# Patient Record
Sex: FEMALE | Race: BLACK OR AFRICAN AMERICAN
[De-identification: names, ages, dates, MRNs, and addresses within clinical notes are randomized per-mention and may not be internally consistent; named-entity substitution may affect disease eponyms.]

---

## 2018-05-06 ENCOUNTER — HOSPITAL ENCOUNTER (OUTPATIENT)
Dept: HOSPITAL 95 - ER | Age: 58
Setting detail: OBSERVATION
LOS: 2 days | Discharge: HOME | End: 2018-05-08
Attending: INTERNAL MEDICINE | Admitting: INTERNAL MEDICINE
Payer: SELF-PAY

## 2018-05-06 VITALS — HEIGHT: 69.02 IN | WEIGHT: 171.08 LBS | BODY MASS INDEX: 25.34 KG/M2

## 2018-05-06 DIAGNOSIS — Z79.52: ICD-10-CM

## 2018-05-06 DIAGNOSIS — R06.03: ICD-10-CM

## 2018-05-06 DIAGNOSIS — Z88.1: ICD-10-CM

## 2018-05-06 DIAGNOSIS — Z88.0: ICD-10-CM

## 2018-05-06 DIAGNOSIS — J96.00: ICD-10-CM

## 2018-05-06 DIAGNOSIS — Z79.899: ICD-10-CM

## 2018-05-06 DIAGNOSIS — E09.65: ICD-10-CM

## 2018-05-06 DIAGNOSIS — J45.901: Primary | ICD-10-CM

## 2018-05-06 DIAGNOSIS — Z88.8: ICD-10-CM

## 2018-05-06 DIAGNOSIS — Z88.5: ICD-10-CM

## 2018-05-06 DIAGNOSIS — M81.0: ICD-10-CM

## 2018-05-06 DIAGNOSIS — Z85.3: ICD-10-CM

## 2018-05-06 DIAGNOSIS — Z79.01: ICD-10-CM

## 2018-05-06 DIAGNOSIS — I10: ICD-10-CM

## 2018-05-06 LAB
ALBUMIN SERPL BCP-MCNC: 3.8 G/DL (ref 3.4–5)
ALBUMIN/GLOB SERPL: 1 {RATIO} (ref 0.8–1.8)
ALT SERPL W P-5'-P-CCNC: 24 U/L (ref 12–78)
ANION GAP SERPL CALCULATED.4IONS-SCNC: 8 MMOL/L (ref 6–16)
AST SERPL W P-5'-P-CCNC: 17 U/L (ref 12–37)
BASOPHILS # BLD AUTO: 0.07 K/MM3 (ref 0–0.23)
BASOPHILS NFR BLD AUTO: 1 % (ref 0–2)
BILIRUB SERPL-MCNC: 0.3 MG/DL (ref 0.1–1)
BUN SERPL-MCNC: 12 MG/DL (ref 8–24)
CALCIUM SERPL-MCNC: 8.7 MG/DL (ref 8.5–10.1)
CHLORIDE SERPL-SCNC: 104 MMOL/L (ref 98–108)
CO2 SERPL-SCNC: 28 MMOL/L (ref 21–32)
CREAT SERPL-MCNC: 0.83 MG/DL (ref 0.4–1)
DEPRECATED RDW RBC AUTO: 48.2 FL (ref 35.1–46.3)
EOSINOPHIL # BLD AUTO: 0.11 K/MM3 (ref 0–0.68)
EOSINOPHIL NFR BLD AUTO: 1 % (ref 0–6)
ERYTHROCYTE [DISTWIDTH] IN BLOOD BY AUTOMATED COUNT: 14.8 % (ref 11.7–14.2)
GLOBULIN SER CALC-MCNC: 3.7 G/DL (ref 2.2–4)
GLUCOSE SERPL-MCNC: 294 MG/DL (ref 70–99)
HCT VFR BLD AUTO: 39.6 % (ref 33–51)
HGB BLD-MCNC: 13 G/DL (ref 11.5–16)
IMM GRANULOCYTES # BLD AUTO: 0.06 K/MM3 (ref 0–0.1)
IMM GRANULOCYTES NFR BLD AUTO: 1 % (ref 0–1)
LYMPHOCYTES # BLD AUTO: 4.78 K/MM3 (ref 0.84–5.2)
LYMPHOCYTES NFR BLD AUTO: 42 % (ref 21–46)
MCHC RBC AUTO-ENTMCNC: 32.8 G/DL (ref 31.5–36.5)
MCV RBC AUTO: 89 FL (ref 80–100)
MONOCYTES # BLD AUTO: 0.9 K/MM3 (ref 0.16–1.47)
MONOCYTES NFR BLD AUTO: 8 % (ref 4–13)
NEUTROPHILS # BLD AUTO: 5.4 K/MM3 (ref 1.96–9.15)
NEUTROPHILS NFR BLD AUTO: 48 % (ref 41–73)
NRBC # BLD AUTO: 0 K/MM3 (ref 0–0.02)
NRBC BLD AUTO-RTO: 0 /100 WBC (ref 0–0.2)
PH BLDA: 7.51 [PH] (ref 7.35–7.45)
PLATELET # BLD AUTO: 307 K/MM3 (ref 150–400)
POTASSIUM SERPL-SCNC: 2.8 MMOL/L (ref 3.5–5.5)
PROT SERPL-MCNC: 7.5 G/DL (ref 6.4–8.2)
SODIUM SERPL-SCNC: 140 MMOL/L (ref 136–145)
TROPONIN I SERPL-MCNC: 0.03 NG/ML (ref 0–0.04)

## 2018-05-06 PROCEDURE — G0378 HOSPITAL OBSERVATION PER HR: HCPCS

## 2018-05-06 PROCEDURE — C9113 INJ PANTOPRAZOLE SODIUM, VIA: HCPCS

## 2019-09-23 ENCOUNTER — HOSPITAL ENCOUNTER (EMERGENCY)
Dept: HOSPITAL 95 - ER | Age: 59
Discharge: HOME | End: 2019-09-23
Payer: OTHER GOVERNMENT

## 2019-09-23 VITALS — WEIGHT: 175 LBS | HEIGHT: 68 IN | BODY MASS INDEX: 26.52 KG/M2

## 2019-09-23 DIAGNOSIS — Z91.018: ICD-10-CM

## 2019-09-23 DIAGNOSIS — Z88.8: ICD-10-CM

## 2019-09-23 DIAGNOSIS — Z88.0: ICD-10-CM

## 2019-09-23 DIAGNOSIS — Z88.5: ICD-10-CM

## 2019-09-23 DIAGNOSIS — Z79.891: ICD-10-CM

## 2019-09-23 DIAGNOSIS — Z79.899: ICD-10-CM

## 2019-09-23 DIAGNOSIS — E11.65: ICD-10-CM

## 2019-09-23 DIAGNOSIS — Z91.030: ICD-10-CM

## 2019-09-23 DIAGNOSIS — Z88.1: ICD-10-CM

## 2019-09-23 DIAGNOSIS — J45.901: Primary | ICD-10-CM

## 2019-09-23 DIAGNOSIS — E87.6: ICD-10-CM

## 2019-09-23 DIAGNOSIS — J96.01: ICD-10-CM

## 2019-09-23 LAB
ALBUMIN SERPL BCP-MCNC: 3.7 G/DL (ref 3.4–5)
ALBUMIN/GLOB SERPL: 1 {RATIO} (ref 0.8–1.8)
ALT SERPL W P-5'-P-CCNC: 18 U/L (ref 12–78)
ANION GAP SERPL CALCULATED.4IONS-SCNC: 8 MMOL/L (ref 6–16)
AST SERPL W P-5'-P-CCNC: 19 U/L (ref 12–37)
BASOPHILS # BLD AUTO: 0.05 K/MM3 (ref 0–0.23)
BASOPHILS NFR BLD AUTO: 0 % (ref 0–2)
BILIRUB SERPL-MCNC: 0.3 MG/DL (ref 0.1–1)
BUN SERPL-MCNC: 17 MG/DL (ref 8–24)
CALCIUM SERPL-MCNC: 9.3 MG/DL (ref 8.5–10.1)
CHLORIDE SERPL-SCNC: 102 MMOL/L (ref 98–108)
CO2 SERPL-SCNC: 30 MMOL/L (ref 21–32)
CREAT SERPL-MCNC: 0.7 MG/DL (ref 0.4–1)
DEPRECATED RDW RBC AUTO: 46.6 FL (ref 35.1–46.3)
EOSINOPHIL # BLD AUTO: 0.03 K/MM3 (ref 0–0.68)
EOSINOPHIL NFR BLD AUTO: 0 % (ref 0–6)
ERYTHROCYTE [DISTWIDTH] IN BLOOD BY AUTOMATED COUNT: 14.6 % (ref 11.7–14.2)
GLOBULIN SER CALC-MCNC: 3.6 G/DL (ref 2.2–4)
GLUCOSE SERPL-MCNC: 223 MG/DL (ref 70–99)
HCT VFR BLD AUTO: 36.9 % (ref 33–51)
HGB BLD-MCNC: 12 G/DL (ref 11.5–16)
IMM GRANULOCYTES # BLD AUTO: 0.04 K/MM3 (ref 0–0.1)
IMM GRANULOCYTES NFR BLD AUTO: 0 % (ref 0–1)
LYMPHOCYTES # BLD AUTO: 1.98 K/MM3 (ref 0.84–5.2)
LYMPHOCYTES NFR BLD AUTO: 18 % (ref 21–46)
MCHC RBC AUTO-ENTMCNC: 32.5 G/DL (ref 31.5–36.5)
MCV RBC AUTO: 88 FL (ref 80–100)
MONOCYTES # BLD AUTO: 0.49 K/MM3 (ref 0.16–1.47)
MONOCYTES NFR BLD AUTO: 4 % (ref 4–13)
NEUTROPHILS # BLD AUTO: 8.64 K/MM3 (ref 1.96–9.15)
NEUTROPHILS NFR BLD AUTO: 77 % (ref 41–73)
NRBC # BLD AUTO: 0 K/MM3 (ref 0–0.02)
NRBC BLD AUTO-RTO: 0 /100 WBC (ref 0–0.2)
PLATELET # BLD AUTO: 318 K/MM3 (ref 150–400)
POTASSIUM SERPL-SCNC: 2.9 MMOL/L (ref 3.5–5.5)
PROT SERPL-MCNC: 7.3 G/DL (ref 6.4–8.2)
SODIUM SERPL-SCNC: 140 MMOL/L (ref 136–145)

## 2021-02-16 ENCOUNTER — HOSPITAL ENCOUNTER (EMERGENCY)
Dept: HOSPITAL 95 - ER | Age: 61
Discharge: HOME | End: 2021-02-16
Payer: OTHER GOVERNMENT

## 2021-02-16 VITALS — BODY MASS INDEX: 28.12 KG/M2 | HEIGHT: 66 IN | WEIGHT: 175 LBS

## 2021-02-16 DIAGNOSIS — Z91.018: ICD-10-CM

## 2021-02-16 DIAGNOSIS — I10: ICD-10-CM

## 2021-02-16 DIAGNOSIS — Z88.5: ICD-10-CM

## 2021-02-16 DIAGNOSIS — J45.901: Primary | ICD-10-CM

## 2021-02-16 DIAGNOSIS — Z88.0: ICD-10-CM

## 2021-02-16 DIAGNOSIS — Z91.030: ICD-10-CM

## 2021-02-16 DIAGNOSIS — Z79.52: ICD-10-CM

## 2021-02-16 DIAGNOSIS — Z88.1: ICD-10-CM

## 2021-02-16 DIAGNOSIS — Z79.899: ICD-10-CM

## 2021-02-16 DIAGNOSIS — Z88.8: ICD-10-CM

## 2021-02-16 LAB
ALBUMIN SERPL BCP-MCNC: 3.6 G/DL (ref 3.4–5)
ALBUMIN/GLOB SERPL: 0.9 {RATIO} (ref 0.8–1.8)
ALT SERPL W P-5'-P-CCNC: 24 U/L (ref 12–78)
ANION GAP SERPL CALCULATED.4IONS-SCNC: 6 MMOL/L (ref 6–16)
AST SERPL W P-5'-P-CCNC: 22 U/L (ref 12–37)
BASOPHILS # BLD AUTO: 0.03 K/MM3 (ref 0–0.23)
BASOPHILS NFR BLD AUTO: 0 % (ref 0–2)
BILIRUB SERPL-MCNC: 0.4 MG/DL (ref 0.1–1)
BUN SERPL-MCNC: 16 MG/DL (ref 8–24)
CALCIUM SERPL-MCNC: 9.4 MG/DL (ref 8.5–10.1)
CHLORIDE SERPL-SCNC: 106 MMOL/L (ref 98–108)
CO2 SERPL-SCNC: 27 MMOL/L (ref 21–32)
CREAT SERPL-MCNC: 0.77 MG/DL (ref 0.4–1)
DEPRECATED RDW RBC AUTO: 46.9 FL (ref 35.1–46.3)
EOSINOPHIL # BLD AUTO: 0.06 K/MM3 (ref 0–0.68)
EOSINOPHIL NFR BLD AUTO: 1 % (ref 0–6)
ERYTHROCYTE [DISTWIDTH] IN BLOOD BY AUTOMATED COUNT: 14.7 % (ref 11.7–14.2)
GLOBULIN SER CALC-MCNC: 4 G/DL (ref 2.2–4)
GLUCOSE SERPL-MCNC: 161 MG/DL (ref 70–99)
HCT VFR BLD AUTO: 37.4 % (ref 33–51)
HGB BLD-MCNC: 12.2 G/DL (ref 11.5–16)
IMM GRANULOCYTES # BLD AUTO: 0.04 K/MM3 (ref 0–0.1)
IMM GRANULOCYTES NFR BLD AUTO: 1 % (ref 0–1)
LYMPHOCYTES # BLD AUTO: 1.33 K/MM3 (ref 0.84–5.2)
LYMPHOCYTES NFR BLD AUTO: 16 % (ref 21–46)
MCHC RBC AUTO-ENTMCNC: 32.6 G/DL (ref 31.5–36.5)
MCV RBC AUTO: 88 FL (ref 80–100)
MONOCYTES # BLD AUTO: 0.43 K/MM3 (ref 0.16–1.47)
MONOCYTES NFR BLD AUTO: 5 % (ref 4–13)
NEUTROPHILS # BLD AUTO: 6.6 K/MM3 (ref 1.96–9.15)
NEUTROPHILS NFR BLD AUTO: 78 % (ref 41–73)
NRBC # BLD AUTO: 0 K/MM3 (ref 0–0.02)
NRBC BLD AUTO-RTO: 0 /100 WBC (ref 0–0.2)
PH BLDA: 7.46 [PH] (ref 7.35–7.45)
PLATELET # BLD AUTO: 264 K/MM3 (ref 150–400)
POTASSIUM SERPL-SCNC: 3.7 MMOL/L (ref 3.5–5.5)
PROT SERPL-MCNC: 7.6 G/DL (ref 6.4–8.2)
SODIUM SERPL-SCNC: 139 MMOL/L (ref 136–145)

## 2022-09-08 ENCOUNTER — HOSPITAL ENCOUNTER (INPATIENT)
Dept: HOSPITAL 95 - ER | Age: 62
LOS: 1 days | Discharge: HOME | DRG: 189 | End: 2022-09-09
Attending: INTERNAL MEDICINE | Admitting: HOSPITALIST
Payer: OTHER GOVERNMENT

## 2022-09-08 VITALS — BODY MASS INDEX: 25.63 KG/M2 | HEIGHT: 68 IN | WEIGHT: 169.09 LBS

## 2022-09-08 DIAGNOSIS — J96.01: Primary | ICD-10-CM

## 2022-09-08 DIAGNOSIS — I48.91: ICD-10-CM

## 2022-09-08 DIAGNOSIS — J45.51: ICD-10-CM

## 2022-09-08 DIAGNOSIS — Z20.822: ICD-10-CM

## 2022-09-08 DIAGNOSIS — Z91.030: ICD-10-CM

## 2022-09-08 DIAGNOSIS — Z91.018: ICD-10-CM

## 2022-09-08 DIAGNOSIS — E09.9: ICD-10-CM

## 2022-09-08 DIAGNOSIS — J30.2: ICD-10-CM

## 2022-09-08 DIAGNOSIS — Z85.3: ICD-10-CM

## 2022-09-08 DIAGNOSIS — Z79.899: ICD-10-CM

## 2022-09-08 DIAGNOSIS — G47.00: ICD-10-CM

## 2022-09-08 DIAGNOSIS — I10: ICD-10-CM

## 2022-09-08 DIAGNOSIS — Z88.8: ICD-10-CM

## 2022-09-08 DIAGNOSIS — F41.9: ICD-10-CM

## 2022-09-08 DIAGNOSIS — M81.0: ICD-10-CM

## 2022-09-08 DIAGNOSIS — Z88.0: ICD-10-CM

## 2022-09-08 DIAGNOSIS — Z90.710: ICD-10-CM

## 2022-09-08 DIAGNOSIS — Z90.13: ICD-10-CM

## 2022-09-08 DIAGNOSIS — Z90.49: ICD-10-CM

## 2022-09-08 DIAGNOSIS — T38.0X5A: ICD-10-CM

## 2022-09-08 DIAGNOSIS — Z88.5: ICD-10-CM

## 2022-09-08 LAB
ALBUMIN SERPL BCP-MCNC: 3.7 G/DL (ref 3.4–5)
ALBUMIN/GLOB SERPL: 1 {RATIO} (ref 0.8–1.8)
ALT SERPL W P-5'-P-CCNC: 22 U/L (ref 12–78)
ANION GAP SERPL CALCULATED.4IONS-SCNC: 8 MMOL/L (ref 6–16)
AST SERPL W P-5'-P-CCNC: 22 U/L (ref 12–37)
BASOPHILS # BLD AUTO: 0.07 K/MM3 (ref 0–0.23)
BASOPHILS NFR BLD AUTO: 1 % (ref 0–2)
BILIRUB SERPL-MCNC: 0.3 MG/DL (ref 0.1–1)
BUN SERPL-MCNC: 12 MG/DL (ref 8–24)
CALCIUM SERPL-MCNC: 9.3 MG/DL (ref 8.5–10.1)
CHLORIDE SERPL-SCNC: 104 MMOL/L (ref 98–108)
CO2 SERPL-SCNC: 28 MMOL/L (ref 21–32)
CREAT SERPL-MCNC: 0.75 MG/DL (ref 0.4–1)
DEPRECATED RDW RBC AUTO: 43.9 FL (ref 35.1–46.3)
EOSINOPHIL # BLD AUTO: 0.18 K/MM3 (ref 0–0.68)
EOSINOPHIL NFR BLD AUTO: 2 % (ref 0–6)
ERYTHROCYTE [DISTWIDTH] IN BLOOD BY AUTOMATED COUNT: 14.1 % (ref 11.7–14.2)
FLUAV RNA SPEC QL NAA+PROBE: NEGATIVE
FLUBV RNA SPEC QL NAA+PROBE: NEGATIVE
GLOBULIN SER CALC-MCNC: 3.8 G/DL (ref 2.2–4)
GLUCOSE SERPL-MCNC: 130 MG/DL (ref 70–99)
HCT VFR BLD AUTO: 36 % (ref 33–51)
HGB BLD-MCNC: 11.9 G/DL (ref 11.5–16)
IMM GRANULOCYTES # BLD AUTO: 0.02 K/MM3 (ref 0–0.1)
IMM GRANULOCYTES NFR BLD AUTO: 0 % (ref 0–1)
LYMPHOCYTES # BLD AUTO: 3.46 K/MM3 (ref 0.84–5.2)
LYMPHOCYTES NFR BLD AUTO: 43 % (ref 21–46)
MCHC RBC AUTO-ENTMCNC: 33.1 G/DL (ref 31.5–36.5)
MCV RBC AUTO: 85 FL (ref 80–100)
MONOCYTES # BLD AUTO: 0.84 K/MM3 (ref 0.16–1.47)
MONOCYTES NFR BLD AUTO: 10 % (ref 4–13)
NEUTROPHILS # BLD AUTO: 3.51 K/MM3 (ref 1.96–9.15)
NEUTROPHILS NFR BLD AUTO: 44 % (ref 41–73)
NRBC # BLD AUTO: 0 K/MM3 (ref 0–0.02)
NRBC BLD AUTO-RTO: 0 /100 WBC (ref 0–0.2)
PH BLDA: 7.39 [PH] (ref 7.35–7.45)
PLATELET # BLD AUTO: 372 K/MM3 (ref 150–400)
POTASSIUM SERPL-SCNC: 3 MMOL/L (ref 3.5–5.5)
PROT SERPL-MCNC: 7.5 G/DL (ref 6.4–8.2)
RSV RNA SPEC QL NAA+PROBE: NEGATIVE
SARS-COV-2 RNA RESP QL NAA+PROBE: NEGATIVE
SODIUM SERPL-SCNC: 140 MMOL/L (ref 136–145)

## 2022-09-08 PROCEDURE — A9270 NON-COVERED ITEM OR SERVICE: HCPCS

## 2022-09-08 PROCEDURE — 5A09357 ASSISTANCE WITH RESPIRATORY VENTILATION, LESS THAN 24 CONSECUTIVE HOURS, CONTINUOUS POSITIVE AIRWAY PRESSURE: ICD-10-PCS | Performed by: INTERNAL MEDICINE

## 2022-09-08 NOTE — NUR
SHIFT SUMMARY-
PT ADMITTED THROUGH THE ED FOR ACUTE RESPIRATORY FAILURE/ASTHMA EXACERBATION.
THIS IS AN ISSUE THE PT HAS FREQUENTLY AND MANAGES WELL AT HOME, FOR THE MOST
PART. PT HAS A Hx OF BREAST CANCER SP DOUBLE MASTECTOMY WITH RECONSTRUCTION
THAT FAILED AND WAS REMOVED. PT HAS SEVERAL ALLERGIES TO FOODS AND MEDICATIONS
THAT CAUSE ANAPHYLACTIC RESPONSE. SIGNS MADE AND PLACED ON THE DOOR TO HER
ROOM TO WARN STAFF NO CARROTS OR PEACHES OR MRS DASH (CONTAINS CARROTS) THE PT
HAS AN ANAPHYLACTIC REACTION TO EATING FOODS THAT HAVE COME IN CONTACT WITH
THESE FOODS. KITCHEN NOTIFIED VERBALLY, DIET ORDER CHANGED TO SHOW THESE AS
DISLIKES AND A FOOD REQUEST ORDER WAS PLACED STATING DEATHLY ALLERGIC TO THESE
ITEMS. PT IS AWARE AND PAYS ATTENTION TO WHAT SHE EATS.
 
PT RECIEVED PO ATIVAN THIS EVENING AND BREATHING SEEMS TO HAVE RELAXED AND SHE
IS RESTING IN BED, HOB ELEVATED, SPOUSE AT THE BEDSIDE. PT STATES SHE CHECKS
HER BG DAILY AND HER NUMBERS AT HOME AR TYPICALLY IN 'S. BG HERE WAS
254 AFTER THE PT RECIEVED IV SOLUMEDROL.
 
PT IS ALERT AND ORIENTED AND INDEPENDENT IN THE ROOM. IN BED, SPOUSE AT THE
BEDSIDE NO S&S OF DISTRESS AT THIS TIME WILL CTM AND PASS ON TO NIGHT RN IN
BEDSIDE REPORT.

## 2022-09-09 LAB
ANION GAP SERPL CALCULATED.4IONS-SCNC: 9 MMOL/L (ref 6–16)
BUN SERPL-MCNC: 14 MG/DL (ref 8–24)
CALCIUM SERPL-MCNC: 9.2 MG/DL (ref 8.5–10.1)
CHLORIDE SERPL-SCNC: 105 MMOL/L (ref 98–108)
CO2 SERPL-SCNC: 25 MMOL/L (ref 21–32)
CREAT SERPL-MCNC: 0.67 MG/DL (ref 0.4–1)
GLUCOSE SERPL-MCNC: 254 MG/DL (ref 70–99)
POTASSIUM SERPL-SCNC: 3.4 MMOL/L (ref 3.5–5.5)
SODIUM SERPL-SCNC: 139 MMOL/L (ref 136–145)

## 2022-09-09 NOTE — NUR
DISCHARGE NOTE-
PT WAS GIVEN VERBAL AND WRITTEN DISCHARGE INSTRUCTIONS AND ACKNOWLEDGED
UNDERSTANDING OF THEM. IV AND TELE DC'D PRIOR TO DISCHARGE. WHEN IV WAS PULLED
THERE WAS HEAVY BLEEDING FROM THE INSERTION POINT. BLEEDING WAS STOPPED PRIOR
TO DISCHARGE. PT WAS ESCORTED OUT VIA WC BY THE CNA NO FURTHER QUESTIONS, NO
S&S OF DISTRESS NOTED AT THE TIME OF DISCHARGE.

## 2022-09-09 NOTE — NUR
PT IS A/OX4, PLEASANT AND COOPERATIVE. THE PT IS UP IND IN HER ROOM. THE PT
APPEARS TO BE BREATHING EASILY ON RA AT THIS TIME. THE PT WEARS A CPAP AT
NIGHT WHILE SLEEPING. THE PT WAS MEDICATED FOR ANXIETY X1 TONIGHT. THE PT
REPORTED SOME SLIGHT RIGHT SIDE OF THROAT SWELLING/TENDERNESS A CALL WAS MADE
TO DR. JERNIGAN PER THE PT'S REQUEST AND ORDERS WERE GIVEN AND ADMINISTRATED. THE
PT WAS MEDICATED FOR ELEVATED BP THIS AM. THE PT WAS AWAKE FOR MOST OF THE
NIGHT. CALL LIGHT IN REACH. WILL CONTINUE TO MONITOR AND ASSESS FOR CHANGES

## 2022-11-30 ENCOUNTER — HOSPITAL ENCOUNTER (EMERGENCY)
Dept: HOSPITAL 95 - ER | Age: 62
Discharge: HOME | End: 2022-11-30
Payer: OTHER GOVERNMENT

## 2022-11-30 VITALS — BODY MASS INDEX: 24.99 KG/M2 | HEIGHT: 65 IN | WEIGHT: 150 LBS

## 2022-11-30 DIAGNOSIS — R06.2: ICD-10-CM

## 2022-11-30 DIAGNOSIS — Z91.030: ICD-10-CM

## 2022-11-30 DIAGNOSIS — I10: ICD-10-CM

## 2022-11-30 DIAGNOSIS — Z88.0: ICD-10-CM

## 2022-11-30 DIAGNOSIS — T78.1XXA: Primary | ICD-10-CM

## 2022-11-30 DIAGNOSIS — Z88.8: ICD-10-CM

## 2022-11-30 DIAGNOSIS — Z91.018: ICD-10-CM

## 2022-11-30 DIAGNOSIS — M81.0: ICD-10-CM

## 2022-11-30 DIAGNOSIS — Z88.1: ICD-10-CM

## 2022-11-30 DIAGNOSIS — Z79.899: ICD-10-CM

## 2022-11-30 DIAGNOSIS — Z79.4: ICD-10-CM

## 2022-11-30 LAB — PH BLDV: 7.46 [PH] (ref 7.34–7.37)

## 2023-05-22 ENCOUNTER — HOSPITAL ENCOUNTER (INPATIENT)
Dept: HOSPITAL 95 - ER | Age: 63
LOS: 2 days | Discharge: HOME | DRG: 202 | End: 2023-05-24
Attending: INTERNAL MEDICINE | Admitting: INTERNAL MEDICINE
Payer: OTHER GOVERNMENT

## 2023-05-22 VITALS — HEIGHT: 68 IN | BODY MASS INDEX: 27.26 KG/M2 | WEIGHT: 179.9 LBS

## 2023-05-22 VITALS — DIASTOLIC BLOOD PRESSURE: 75 MMHG | SYSTOLIC BLOOD PRESSURE: 170 MMHG

## 2023-05-22 VITALS — DIASTOLIC BLOOD PRESSURE: 66 MMHG | SYSTOLIC BLOOD PRESSURE: 148 MMHG

## 2023-05-22 VITALS — DIASTOLIC BLOOD PRESSURE: 67 MMHG | SYSTOLIC BLOOD PRESSURE: 174 MMHG

## 2023-05-22 DIAGNOSIS — Z79.52: ICD-10-CM

## 2023-05-22 DIAGNOSIS — I10: ICD-10-CM

## 2023-05-22 DIAGNOSIS — Z91.030: ICD-10-CM

## 2023-05-22 DIAGNOSIS — Z79.01: ICD-10-CM

## 2023-05-22 DIAGNOSIS — Z85.3: ICD-10-CM

## 2023-05-22 DIAGNOSIS — Z90.49: ICD-10-CM

## 2023-05-22 DIAGNOSIS — B37.0: ICD-10-CM

## 2023-05-22 DIAGNOSIS — Z90.710: ICD-10-CM

## 2023-05-22 DIAGNOSIS — Z90.13: ICD-10-CM

## 2023-05-22 DIAGNOSIS — Z88.0: ICD-10-CM

## 2023-05-22 DIAGNOSIS — Z79.51: ICD-10-CM

## 2023-05-22 DIAGNOSIS — E09.40: ICD-10-CM

## 2023-05-22 DIAGNOSIS — K21.9: ICD-10-CM

## 2023-05-22 DIAGNOSIS — E87.6: ICD-10-CM

## 2023-05-22 DIAGNOSIS — Z88.1: ICD-10-CM

## 2023-05-22 DIAGNOSIS — T38.0X5A: ICD-10-CM

## 2023-05-22 DIAGNOSIS — Z79.4: ICD-10-CM

## 2023-05-22 DIAGNOSIS — Z88.8: ICD-10-CM

## 2023-05-22 DIAGNOSIS — M81.0: ICD-10-CM

## 2023-05-22 DIAGNOSIS — Z91.018: ICD-10-CM

## 2023-05-22 DIAGNOSIS — G47.00: ICD-10-CM

## 2023-05-22 DIAGNOSIS — Z79.899: ICD-10-CM

## 2023-05-22 DIAGNOSIS — I48.20: ICD-10-CM

## 2023-05-22 DIAGNOSIS — J45.51: Primary | ICD-10-CM

## 2023-05-22 LAB
ANION GAP SERPL CALCULATED.4IONS-SCNC: 5 MMOL/L (ref 6–16)
BASOPHILS # BLD AUTO: 0.06 K/MM3 (ref 0–0.23)
BASOPHILS NFR BLD AUTO: 1 % (ref 0–2)
BUN SERPL-MCNC: 12 MG/DL (ref 8–24)
CALCIUM SERPL-MCNC: 8.9 MG/DL (ref 8.5–10.1)
CHLORIDE SERPL-SCNC: 106 MMOL/L (ref 98–108)
CO2 SERPL-SCNC: 27 MMOL/L (ref 21–32)
CREAT SERPL-MCNC: 0.69 MG/DL (ref 0.4–1)
DEPRECATED RDW RBC AUTO: 45.6 FL (ref 35.1–46.3)
EOSINOPHIL # BLD AUTO: 0.22 K/MM3 (ref 0–0.68)
EOSINOPHIL NFR BLD AUTO: 3 % (ref 0–6)
ERYTHROCYTE [DISTWIDTH] IN BLOOD BY AUTOMATED COUNT: 14.6 % (ref 11.7–14.2)
GLUCOSE SERPL-MCNC: 148 MG/DL (ref 70–99)
HCT VFR BLD AUTO: 34.4 % (ref 33–51)
HGB BLD-MCNC: 11.2 G/DL (ref 11.5–16)
IMM GRANULOCYTES # BLD AUTO: 0.03 K/MM3 (ref 0–0.1)
IMM GRANULOCYTES NFR BLD AUTO: 0 % (ref 0–1)
LYMPHOCYTES # BLD AUTO: 2.86 K/MM3 (ref 0.84–5.2)
LYMPHOCYTES NFR BLD AUTO: 36 % (ref 21–46)
MCHC RBC AUTO-ENTMCNC: 32.6 G/DL (ref 31.5–36.5)
MCV RBC AUTO: 86 FL (ref 80–100)
MONOCYTES # BLD AUTO: 0.72 K/MM3 (ref 0.16–1.47)
MONOCYTES NFR BLD AUTO: 9 % (ref 4–13)
NEUTROPHILS # BLD AUTO: 4.13 K/MM3 (ref 1.96–9.15)
NEUTROPHILS NFR BLD AUTO: 52 % (ref 41–73)
NRBC # BLD AUTO: 0 K/MM3 (ref 0–0.02)
NRBC BLD AUTO-RTO: 0 /100 WBC (ref 0–0.2)
PH BLDV: 7.38 [PH] (ref 7.34–7.37)
PH BLDV: 7.44 [PH] (ref 7.34–7.37)
PLATELET # BLD AUTO: 297 K/MM3 (ref 150–400)
POTASSIUM SERPL-SCNC: 2.8 MMOL/L (ref 3.5–5.5)
SODIUM SERPL-SCNC: 138 MMOL/L (ref 136–145)

## 2023-05-22 PROCEDURE — G0378 HOSPITAL OBSERVATION PER HR: HCPCS

## 2023-05-22 PROCEDURE — A9270 NON-COVERED ITEM OR SERVICE: HCPCS

## 2023-05-23 VITALS — DIASTOLIC BLOOD PRESSURE: 66 MMHG | SYSTOLIC BLOOD PRESSURE: 163 MMHG

## 2023-05-23 VITALS — DIASTOLIC BLOOD PRESSURE: 71 MMHG | SYSTOLIC BLOOD PRESSURE: 145 MMHG

## 2023-05-23 VITALS — SYSTOLIC BLOOD PRESSURE: 149 MMHG | DIASTOLIC BLOOD PRESSURE: 65 MMHG

## 2023-05-23 VITALS — SYSTOLIC BLOOD PRESSURE: 149 MMHG | DIASTOLIC BLOOD PRESSURE: 58 MMHG

## 2023-05-23 VITALS — DIASTOLIC BLOOD PRESSURE: 52 MMHG | SYSTOLIC BLOOD PRESSURE: 137 MMHG

## 2023-05-23 VITALS — DIASTOLIC BLOOD PRESSURE: 62 MMHG | SYSTOLIC BLOOD PRESSURE: 149 MMHG

## 2023-05-23 LAB
ANION GAP SERPL CALCULATED.4IONS-SCNC: 7 MMOL/L (ref 6–16)
BUN SERPL-MCNC: 15 MG/DL (ref 8–24)
CALCIUM SERPL-MCNC: 9.5 MG/DL (ref 8.5–10.1)
CHLORIDE SERPL-SCNC: 108 MMOL/L (ref 98–108)
CO2 SERPL-SCNC: 25 MMOL/L (ref 21–32)
CREAT SERPL-MCNC: 0.6 MG/DL (ref 0.4–1)
GLUCOSE SERPL-MCNC: 276 MG/DL (ref 70–99)
POTASSIUM SERPL-SCNC: 4 MMOL/L (ref 3.5–5.5)
SODIUM SERPL-SCNC: 140 MMOL/L (ref 136–145)

## 2023-05-23 PROCEDURE — 02HV33Z INSERTION OF INFUSION DEVICE INTO SUPERIOR VENA CAVA, PERCUTANEOUS APPROACH: ICD-10-PCS | Performed by: INTERNAL MEDICINE

## 2023-05-23 PROCEDURE — 5A09357 ASSISTANCE WITH RESPIRATORY VENTILATION, LESS THAN 24 CONSECUTIVE HOURS, CONTINUOUS POSITIVE AIRWAY PRESSURE: ICD-10-PCS | Performed by: INTERNAL MEDICINE

## 2023-05-23 NOTE — NUR
SHIFT SUMMARY
 
PT IS A/Ox4 AND COOPERATIVE WITH CARE PROVIDED BY MEMBERS OF STAFF. ANSWERS
QUESTIONS APPROPRIATELY AND ABLE TO MAKE HER NEEDS KNOWN. NO ACUTE EVENTS
OVERNIGHT FOR PT WAS ABLE TO SLEEP T/O MOST OF THE SHIFT. RESPIRATORY WISE,
MAINTAINED SPO2 >94% ON RA, INTERMITTENT EPISODES OF SOB WITH PT STATING "MY
LUNGS FEEL TIGHT". SCHEDULED BREATHING TX AS WELL AS ADMIN. OF IV STEROIDS
HAVE BEEN EFFECTIVE. USED BIPAP AT NIGHT WITH 12/6 @30% FIO2. CARDIAC WISE,
REMAINED IN SR WITH RATE RANGING FROM 60-80. NO C/O CP OR PRESSURE T/O THE
NIGHT. SBP A LITTLE HYPERTENSIVE, BUT RESPONDED WELL TO SCHEDULED MEDICATIONS.
PAIN HAS BEEN ADEQUATELY MANAGED AS ORDERED VIA EMAR. ABLE TO ABULATE TO
BATHROOM TO VOID WITH LITTLE ASSIST FROM STAFF. MEDIPORT PRESENT WITH VERBAL
CONSENT FROM PT TO USE, BUT MEDIPORT NOT ACCESSED AT THIS TIME. NO NEW ORDERS
AT THIS TIME, WILL REPORT TO ONCOMING RN. JAMAL WILLIAMSON T/O THE SHIFT

## 2023-05-23 NOTE — NUR
RN/DAY SHIFT SUMMARY
MORNING SHIFT REPORT RECEIVED DURING BEDSIDE GREETING WITH PATIENT. PATIENT
ASSESSMENT COMPLETED WITH MEDICATION AND DM BLOOD SUGAR MANAGEMENT. THE
PATIENT IS PLEASENT AND EAGER TO SPEAK ABOUT BEING A . THE PATIENT HAD
AN ALLERGIC REACTION DURING LUNCH AND WAS GIVEN 50 MG OF DIPHENHYDRAMINE AND 1
MG OF EPI AND THE PATIENT THEN STABLIZED. CONTINUE TO MONITOR UNTIL NIGHT
NURSE ARRIVES.

## 2023-05-23 NOTE — NUR
RN NOTE
AS OF 12:15 THE PATIENT WAS SERVED A DISH WITH CARROTS AND THE PATIENT HAD A
SEVER ALERGIC REACTION. NO CODE OR RRT WAS CALLED THE PROVIDER WAS NOTIFIED
AND 50 MG OF DIPHENHYDRAMINE WAS GIVEN VIA IV AND EPI 1 MG WAS ADMINISTERED
SHORTLY THERE AFTER. THE PATIENT IS STABLE AND WE WILL CONTINUE TO MONITOR.

## 2023-05-24 VITALS — DIASTOLIC BLOOD PRESSURE: 80 MMHG | SYSTOLIC BLOOD PRESSURE: 174 MMHG

## 2023-05-24 VITALS — SYSTOLIC BLOOD PRESSURE: 134 MMHG | DIASTOLIC BLOOD PRESSURE: 68 MMHG

## 2023-05-24 NOTE — NUR
SHIFT SUMMARY
 
PT A&Ox4, CALLS AND COMMUNCIATES NEEDS APPROPRIATLEY. BP STABLE, SINUS 60's,
DENIES CP/PRESSURE. SpO2> 92% RA/CPAP, DENIES SOB. PT IND IN ROOM. NO OTHER
EVENTS, WILL REPORT TO ONCOMING RN.

## 2023-05-24 NOTE — NUR
RN/DAY SHIFT SUMMARY
MORNING REPORT RECIEVED AT BEDSIDE DURING PATIENT GREETING. THE PATIENT WAS
ASSESSED AND MEDICATED PRIOR TO RECIEVING DISCHARGE ORDERS. THE PATIENT IS
PLEASANTLY AWAITING THE ARRIVAL OF HER  FOR THE TRIP HOME POST
DISCHARGE.

## 2024-04-08 ENCOUNTER — HOSPITAL ENCOUNTER (OUTPATIENT)
Dept: HOSPITAL 95 - ER | Age: 64
Setting detail: OBSERVATION
LOS: 1 days | Discharge: HOME | End: 2024-04-09
Attending: HOSPITALIST | Admitting: HOSPITALIST
Payer: OTHER GOVERNMENT

## 2024-04-08 VITALS — SYSTOLIC BLOOD PRESSURE: 138 MMHG | DIASTOLIC BLOOD PRESSURE: 80 MMHG

## 2024-04-08 VITALS — DIASTOLIC BLOOD PRESSURE: 70 MMHG | SYSTOLIC BLOOD PRESSURE: 153 MMHG

## 2024-04-08 VITALS — HEIGHT: 68 IN | WEIGHT: 182.1 LBS | BODY MASS INDEX: 27.6 KG/M2

## 2024-04-08 DIAGNOSIS — Z79.01: ICD-10-CM

## 2024-04-08 DIAGNOSIS — J45.51: Primary | ICD-10-CM

## 2024-04-08 DIAGNOSIS — Z79.4: ICD-10-CM

## 2024-04-08 DIAGNOSIS — I10: ICD-10-CM

## 2024-04-08 DIAGNOSIS — I48.0: ICD-10-CM

## 2024-04-08 DIAGNOSIS — Z88.1: ICD-10-CM

## 2024-04-08 DIAGNOSIS — Z91.030: ICD-10-CM

## 2024-04-08 DIAGNOSIS — G47.33: ICD-10-CM

## 2024-04-08 DIAGNOSIS — Z79.899: ICD-10-CM

## 2024-04-08 DIAGNOSIS — Z91.018: ICD-10-CM

## 2024-04-08 DIAGNOSIS — E09.9: ICD-10-CM

## 2024-04-08 DIAGNOSIS — Z85.3: ICD-10-CM

## 2024-04-08 DIAGNOSIS — T38.0X5A: ICD-10-CM

## 2024-04-08 DIAGNOSIS — Z88.8: ICD-10-CM

## 2024-04-08 DIAGNOSIS — Z90.13: ICD-10-CM

## 2024-04-08 DIAGNOSIS — E87.6: ICD-10-CM

## 2024-04-08 DIAGNOSIS — F41.9: ICD-10-CM

## 2024-04-08 DIAGNOSIS — Z88.0: ICD-10-CM

## 2024-04-08 LAB
ALBUMIN SERPL BCP-MCNC: 3.4 G/DL (ref 3.4–5)
ALBUMIN/GLOB SERPL: 1 {RATIO} (ref 0.8–1.8)
ALT SERPL W P-5'-P-CCNC: 21 U/L (ref 12–78)
ANION GAP SERPL CALCULATED.4IONS-SCNC: 10 MMOL/L (ref 3–11)
AST SERPL W P-5'-P-CCNC: 15 U/L (ref 12–37)
BASOPHILS # BLD AUTO: 0.06 K/MM3 (ref 0–0.23)
BASOPHILS NFR BLD AUTO: 1 % (ref 0–2)
BILIRUB SERPL-MCNC: 0.2 MG/DL (ref 0.1–1)
BUN SERPL-MCNC: 14 MG/DL (ref 8–24)
CALCIUM SERPL-MCNC: 9.3 MG/DL (ref 8.5–10.1)
CHLORIDE SERPL-SCNC: 106 MMOL/L (ref 98–108)
CO2 SERPL-SCNC: 27 MMOL/L (ref 21–32)
CREAT SERPL-MCNC: 0.79 MG/DL (ref 0.4–1)
DEPRECATED RDW RBC AUTO: 45.4 FL (ref 35.1–46.3)
EOSINOPHIL # BLD AUTO: 0.12 K/MM3 (ref 0–0.68)
EOSINOPHIL NFR BLD AUTO: 1 % (ref 0–6)
ERYTHROCYTE [DISTWIDTH] IN BLOOD BY AUTOMATED COUNT: 14.6 % (ref 11.7–14.2)
GLOBULIN SER CALC-MCNC: 3.4 G/DL (ref 2.2–4)
GLUCOSE SERPL-MCNC: 323 MG/DL (ref 70–99)
HCT VFR BLD AUTO: 35 % (ref 33–51)
HGB BLD-MCNC: 11.7 G/DL (ref 11.5–16)
IMM GRANULOCYTES # BLD AUTO: 0.07 K/MM3 (ref 0–0.1)
IMM GRANULOCYTES NFR BLD AUTO: 1 % (ref 0–1)
LYMPHOCYTES # BLD AUTO: 2.48 K/MM3 (ref 0.84–5.2)
LYMPHOCYTES NFR BLD AUTO: 27 % (ref 21–46)
MCHC RBC AUTO-ENTMCNC: 33.4 G/DL (ref 31.5–36.5)
MCV RBC AUTO: 85 FL (ref 80–100)
MONOCYTES # BLD AUTO: 0.71 K/MM3 (ref 0.16–1.47)
MONOCYTES NFR BLD AUTO: 8 % (ref 4–13)
NEUTROPHILS # BLD AUTO: 5.78 K/MM3 (ref 1.96–9.15)
NEUTROPHILS NFR BLD AUTO: 63 % (ref 41–73)
NRBC # BLD AUTO: 0 K/MM3 (ref 0–0.02)
NRBC BLD AUTO-RTO: 0 /100 WBC (ref 0–0.2)
PH BLDA: 7.46 [PH] (ref 7.35–7.45)
PLATELET # BLD AUTO: 304 K/MM3 (ref 150–400)
POTASSIUM SERPL-SCNC: 2.9 MMOL/L (ref 3.5–5.5)
PROT SERPL-MCNC: 6.8 G/DL (ref 6.4–8.2)
SODIUM SERPL-SCNC: 140 MMOL/L (ref 136–145)

## 2024-04-08 PROCEDURE — A9270 NON-COVERED ITEM OR SERVICE: HCPCS

## 2024-04-08 PROCEDURE — C9113 INJ PANTOPRAZOLE SODIUM, VIA: HCPCS

## 2024-04-08 PROCEDURE — G0378 HOSPITAL OBSERVATION PER HR: HCPCS

## 2024-04-09 VITALS — DIASTOLIC BLOOD PRESSURE: 74 MMHG | SYSTOLIC BLOOD PRESSURE: 181 MMHG

## 2024-04-09 VITALS — DIASTOLIC BLOOD PRESSURE: 87 MMHG | SYSTOLIC BLOOD PRESSURE: 163 MMHG

## 2024-04-09 VITALS — SYSTOLIC BLOOD PRESSURE: 181 MMHG | DIASTOLIC BLOOD PRESSURE: 74 MMHG

## 2024-04-09 VITALS — DIASTOLIC BLOOD PRESSURE: 77 MMHG | SYSTOLIC BLOOD PRESSURE: 156 MMHG

## 2024-04-09 LAB
ANION GAP SERPL CALCULATED.4IONS-SCNC: 12 MMOL/L (ref 3–11)
BUN SERPL-MCNC: 15 MG/DL (ref 8–24)
CALCIUM SERPL-MCNC: 9.3 MG/DL (ref 8.5–10.1)
CHLORIDE SERPL-SCNC: 109 MMOL/L (ref 98–108)
CO2 SERPL-SCNC: 24 MMOL/L (ref 21–32)
CREAT SERPL-MCNC: 0.63 MG/DL (ref 0.4–1)
GLUCOSE SERPL-MCNC: 276 MG/DL (ref 70–99)
POTASSIUM SERPL-SCNC: 4.3 MMOL/L (ref 3.5–5.5)
SODIUM SERPL-SCNC: 141 MMOL/L (ref 136–145)

## 2024-04-09 NOTE — NUR
Patient is sitting on EOB and alert. She tells me about the events that led to
her admission to the hospital, her on going coleman with very reactive lungs
and the possible d/c planned for this day. She talks about her 13 yrs in the
[employer], her deployment to [place] and the lasting health battles she has
had since. She talks about her spouse, the wonderful 16 day [place]
cruise she has upcoming and her desire to stay well for these kinds of events
in life. I provide therapeutic listening and prayer. Patient responded with
gratitude for the visit.

## 2024-04-09 NOTE — NUR
SHIFT SUMMARY
 
PT A&Ox4, CALLS AND COMMUNICATES NEEDS APPROPRIATELY. BP STABLE, SINUS
70-90's, DENIES CP/PRESSURE. SpO2> 92% RA WHILE AWAKE, PT WORE BIPAP WHILE
ASLEEP. CONTINENT OF UNIRE, NO BM THIS SHIFT. IND IN ROOM. C/O GENERAL PAIN,
DENIED PAIN MEDICATION. PT WANTING TO GO HOME TODAY. NO OTHER EVENTS, WILL
REPORT TO ONCOMING RN.

## 2024-12-16 ENCOUNTER — HOSPITAL ENCOUNTER (EMERGENCY)
Dept: HOSPITAL 95 - ER | Age: 64
Discharge: HOME | End: 2024-12-16
Payer: OTHER GOVERNMENT

## 2024-12-16 VITALS — SYSTOLIC BLOOD PRESSURE: 144 MMHG | DIASTOLIC BLOOD PRESSURE: 67 MMHG

## 2024-12-16 VITALS — BODY MASS INDEX: 26.52 KG/M2 | WEIGHT: 175 LBS | HEIGHT: 68 IN

## 2024-12-16 DIAGNOSIS — T88.6XXA: Primary | ICD-10-CM

## 2024-12-16 DIAGNOSIS — Z88.1: ICD-10-CM

## 2024-12-16 DIAGNOSIS — T38.0X5A: ICD-10-CM

## 2024-12-16 DIAGNOSIS — Z79.01: ICD-10-CM

## 2024-12-16 DIAGNOSIS — Z91.030: ICD-10-CM

## 2024-12-16 DIAGNOSIS — Z88.0: ICD-10-CM

## 2024-12-16 DIAGNOSIS — J45.909: ICD-10-CM

## 2024-12-16 DIAGNOSIS — E09.9: ICD-10-CM

## 2024-12-16 DIAGNOSIS — I48.91: ICD-10-CM

## 2024-12-16 DIAGNOSIS — Z88.5: ICD-10-CM

## 2024-12-16 DIAGNOSIS — Z88.8: ICD-10-CM

## 2024-12-16 DIAGNOSIS — Z91.018: ICD-10-CM

## 2024-12-16 LAB
ANION GAP SERPL CALCULATED.4IONS-SCNC: 12 MMOL/L (ref 3–11)
BASOPHILS # BLD AUTO: 0.01 K/MM3 (ref 0–0.23)
BASOPHILS NFR BLD AUTO: 0 % (ref 0–2)
BUN SERPL-MCNC: 12 MG/DL (ref 8–24)
CALCIUM SERPL-MCNC: 9.4 MG/DL (ref 8.5–10.1)
CHLORIDE SERPL-SCNC: 105 MMOL/L (ref 98–108)
CO2 SERPL-SCNC: 21 MMOL/L (ref 21–32)
CREAT SERPL-MCNC: 0.83 MG/DL (ref 0.4–1)
DEPRECATED RDW RBC AUTO: 45.6 FL (ref 35.1–46.3)
EOSINOPHIL # BLD AUTO: 0 K/MM3 (ref 0–0.68)
EOSINOPHIL NFR BLD AUTO: 0 % (ref 0–6)
ERYTHROCYTE [DISTWIDTH] IN BLOOD BY AUTOMATED COUNT: 14.2 % (ref 11.7–14.2)
GLUCOSE SERPL-MCNC: 196 MG/DL (ref 70–99)
HCT VFR BLD AUTO: 34.6 % (ref 33–51)
HGB BLD-MCNC: 11.5 G/DL (ref 11.5–16)
IMM GRANULOCYTES # BLD AUTO: 0.09 K/MM3 (ref 0–0.1)
IMM GRANULOCYTES NFR BLD AUTO: 1 % (ref 0–1)
LYMPHOCYTES # BLD AUTO: 2.28 K/MM3 (ref 0.84–5.2)
LYMPHOCYTES NFR BLD AUTO: 27 % (ref 21–46)
MCHC RBC AUTO-ENTMCNC: 33.2 G/DL (ref 31.5–36.5)
MCV RBC AUTO: 87 FL (ref 80–100)
MONOCYTES # BLD AUTO: 0.38 K/MM3 (ref 0.16–1.47)
MONOCYTES NFR BLD AUTO: 5 % (ref 4–13)
NEUTROPHILS # BLD AUTO: 5.7 K/MM3 (ref 1.96–9.15)
NEUTROPHILS NFR BLD AUTO: 67 % (ref 41–73)
NRBC # BLD AUTO: 0 K/MM3 (ref 0–0.02)
NRBC BLD AUTO-RTO: 0 /100 WBC (ref 0–0.2)
PLATELET # BLD AUTO: 353 K/MM3 (ref 150–400)
POTASSIUM SERPL-SCNC: 3.7 MMOL/L (ref 3.5–5.5)
SODIUM SERPL-SCNC: 134 MMOL/L (ref 136–145)

## 2025-04-21 ENCOUNTER — HOSPITAL ENCOUNTER (OUTPATIENT)
Dept: HOSPITAL 95 - ORSCMMR | Age: 65
Discharge: HOME | End: 2025-04-21
Attending: SURGERY
Payer: OTHER GOVERNMENT

## 2025-04-21 VITALS — DIASTOLIC BLOOD PRESSURE: 68 MMHG | SYSTOLIC BLOOD PRESSURE: 114 MMHG

## 2025-04-21 VITALS — DIASTOLIC BLOOD PRESSURE: 69 MMHG | SYSTOLIC BLOOD PRESSURE: 159 MMHG

## 2025-04-21 VITALS — SYSTOLIC BLOOD PRESSURE: 117 MMHG | DIASTOLIC BLOOD PRESSURE: 64 MMHG

## 2025-04-21 VITALS — DIASTOLIC BLOOD PRESSURE: 53 MMHG | SYSTOLIC BLOOD PRESSURE: 106 MMHG

## 2025-04-21 VITALS — SYSTOLIC BLOOD PRESSURE: 134 MMHG | DIASTOLIC BLOOD PRESSURE: 62 MMHG

## 2025-04-21 VITALS — SYSTOLIC BLOOD PRESSURE: 115 MMHG | DIASTOLIC BLOOD PRESSURE: 63 MMHG

## 2025-04-21 VITALS — DIASTOLIC BLOOD PRESSURE: 58 MMHG | SYSTOLIC BLOOD PRESSURE: 128 MMHG

## 2025-04-21 VITALS — DIASTOLIC BLOOD PRESSURE: 56 MMHG | SYSTOLIC BLOOD PRESSURE: 113 MMHG

## 2025-04-21 VITALS — SYSTOLIC BLOOD PRESSURE: 108 MMHG | DIASTOLIC BLOOD PRESSURE: 59 MMHG

## 2025-04-21 VITALS — WEIGHT: 168.21 LBS | HEIGHT: 68 IN | BODY MASS INDEX: 25.49 KG/M2

## 2025-04-21 DIAGNOSIS — Z79.899: ICD-10-CM

## 2025-04-21 DIAGNOSIS — Z79.51: ICD-10-CM

## 2025-04-21 DIAGNOSIS — E11.40: ICD-10-CM

## 2025-04-21 DIAGNOSIS — K43.0: Primary | ICD-10-CM

## 2025-04-21 DIAGNOSIS — I48.0: ICD-10-CM

## 2025-04-21 DIAGNOSIS — I10: ICD-10-CM

## 2025-04-21 DIAGNOSIS — Z79.4: ICD-10-CM

## 2025-04-21 DIAGNOSIS — Z85.3: ICD-10-CM

## 2025-04-21 DIAGNOSIS — Z79.01: ICD-10-CM

## 2025-04-21 DIAGNOSIS — J45.909: ICD-10-CM

## 2025-04-21 PROCEDURE — A9270 NON-COVERED ITEM OR SERVICE: HCPCS

## 2025-04-21 PROCEDURE — 0WUF0JZ SUPPLEMENT ABDOMINAL WALL WITH SYNTHETIC SUBSTITUTE, OPEN APPROACH: ICD-10-PCS | Performed by: SURGERY

## 2025-04-21 PROCEDURE — C1781 MESH (IMPLANTABLE): HCPCS
